# Patient Record
Sex: MALE | Race: WHITE | Employment: OTHER | URBAN - METROPOLITAN AREA
[De-identification: names, ages, dates, MRNs, and addresses within clinical notes are randomized per-mention and may not be internally consistent; named-entity substitution may affect disease eponyms.]

---

## 2017-11-18 ENCOUNTER — HOSPITAL ENCOUNTER (OUTPATIENT)
Dept: CT IMAGING | Age: 62
Discharge: HOME | End: 2017-11-18
Attending: ORTHOPAEDIC SURGERY
Payer: MEDICARE

## 2017-11-18 DIAGNOSIS — M19.072 ARTHRITIS OF LEFT ANKLE: ICD-10-CM

## 2017-11-18 PROCEDURE — 73700 CT LOWER EXTREMITY W/O DYE: CPT

## 2018-01-05 ENCOUNTER — HOSPITAL ENCOUNTER (OUTPATIENT)
Dept: PREADMISSION TESTING | Age: 63
Discharge: HOME | End: 2018-01-05

## 2018-01-05 VITALS
HEIGHT: 64 IN | DIASTOLIC BLOOD PRESSURE: 81 MMHG | WEIGHT: 169 LBS | OXYGEN SATURATION: 98 % | TEMPERATURE: 98.3 F | BODY MASS INDEX: 28.85 KG/M2 | SYSTOLIC BLOOD PRESSURE: 152 MMHG | HEART RATE: 62 BPM

## 2018-01-10 ENCOUNTER — APPOINTMENT (OUTPATIENT)
Dept: GENERAL RADIOLOGY | Age: 63
End: 2018-01-10
Attending: ORTHOPAEDIC SURGERY
Payer: MEDICARE

## 2018-01-10 ENCOUNTER — ANESTHESIA (OUTPATIENT)
Dept: SURGERY | Age: 63
End: 2018-01-10
Payer: MEDICARE

## 2018-01-10 ENCOUNTER — ANESTHESIA EVENT (OUTPATIENT)
Dept: SURGERY | Age: 63
End: 2018-01-10
Payer: MEDICARE

## 2018-01-10 ENCOUNTER — HOSPITAL ENCOUNTER (OUTPATIENT)
Age: 63
Setting detail: OBSERVATION
Discharge: HOME | End: 2018-01-11
Attending: ORTHOPAEDIC SURGERY | Admitting: ORTHOPAEDIC SURGERY
Payer: MEDICARE

## 2018-01-10 DIAGNOSIS — M79.672 FOOT PAIN, LEFT: ICD-10-CM

## 2018-01-10 DIAGNOSIS — M05.79 RHEUMATOID ARTHRITIS INVOLVING MULTIPLE SITES WITH POSITIVE RHEUMATOID FACTOR (HCC): Primary | ICD-10-CM

## 2018-01-10 PROCEDURE — 77030018836 HC SOL IRR NACL ICUM -A: Performed by: ORTHOPAEDIC SURGERY

## 2018-01-10 PROCEDURE — 77030003882 HC BIT DRL TWST BRSM -B: Performed by: ORTHOPAEDIC SURGERY

## 2018-01-10 PROCEDURE — 74011250636 HC RX REV CODE- 250/636: Performed by: ANESTHESIOLOGY

## 2018-01-10 PROCEDURE — 76010000132 HC OR TIME 2.5 TO 3 HR: Performed by: ORTHOPAEDIC SURGERY

## 2018-01-10 PROCEDURE — 74011250636 HC RX REV CODE- 250/636: Performed by: ORTHOPAEDIC SURGERY

## 2018-01-10 PROCEDURE — 77030003757 HC BIT DRL DISP ZIMM -C: Performed by: ORTHOPAEDIC SURGERY

## 2018-01-10 PROCEDURE — 76210000006 HC OR PH I REC 0.5 TO 1 HR: Performed by: ORTHOPAEDIC SURGERY

## 2018-01-10 PROCEDURE — 77030018673: Performed by: ORTHOPAEDIC SURGERY

## 2018-01-10 PROCEDURE — 77030028224 HC PDNG CST BSNM -A: Performed by: ORTHOPAEDIC SURGERY

## 2018-01-10 PROCEDURE — C1769 GUIDE WIRE: HCPCS | Performed by: ORTHOPAEDIC SURGERY

## 2018-01-10 PROCEDURE — 77030031139 HC SUT VCRL2 J&J -A: Performed by: ORTHOPAEDIC SURGERY

## 2018-01-10 PROCEDURE — 77030002916 HC SUT ETHLN J&J -A: Performed by: ORTHOPAEDIC SURGERY

## 2018-01-10 PROCEDURE — 77030003601 HC NDL NRV BLK BBMI -A

## 2018-01-10 PROCEDURE — 74011250636 HC RX REV CODE- 250/636

## 2018-01-10 PROCEDURE — 77030002933 HC SUT MCRYL J&J -A: Performed by: ORTHOPAEDIC SURGERY

## 2018-01-10 PROCEDURE — 77030011640 HC PAD GRND REM COVD -A: Performed by: ORTHOPAEDIC SURGERY

## 2018-01-10 PROCEDURE — C1713 ANCHOR/SCREW BN/BN,TIS/BN: HCPCS | Performed by: ORTHOPAEDIC SURGERY

## 2018-01-10 PROCEDURE — 99218 HC RM OBSERVATION: CPT

## 2018-01-10 PROCEDURE — 74011000250 HC RX REV CODE- 250

## 2018-01-10 PROCEDURE — 76001 XR FLUOROSCOPY OVER 60 MINUTES: CPT

## 2018-01-10 PROCEDURE — 73600 X-RAY EXAM OF ANKLE: CPT

## 2018-01-10 PROCEDURE — 77030020782 HC GWN BAIR PAWS FLX 3M -B

## 2018-01-10 PROCEDURE — 77030000032 HC CUF TRNQT ZIMM -B: Performed by: ORTHOPAEDIC SURGERY

## 2018-01-10 PROCEDURE — 76060000036 HC ANESTHESIA 2.5 TO 3 HR: Performed by: ORTHOPAEDIC SURGERY

## 2018-01-10 DEVICE — IMPLANTABLE DEVICE: Type: IMPLANTABLE DEVICE | Site: ANKLE | Status: FUNCTIONAL

## 2018-01-10 RX ORDER — MIDAZOLAM HYDROCHLORIDE 1 MG/ML
0.5 INJECTION, SOLUTION INTRAMUSCULAR; INTRAVENOUS
Status: DISCONTINUED | OUTPATIENT
Start: 2018-01-10 | End: 2018-01-10 | Stop reason: HOSPADM

## 2018-01-10 RX ORDER — DIPHENHYDRAMINE HYDROCHLORIDE 50 MG/ML
12.5 INJECTION, SOLUTION INTRAMUSCULAR; INTRAVENOUS AS NEEDED
Status: DISCONTINUED | OUTPATIENT
Start: 2018-01-10 | End: 2018-01-10 | Stop reason: HOSPADM

## 2018-01-10 RX ORDER — SODIUM CHLORIDE, SODIUM LACTATE, POTASSIUM CHLORIDE, CALCIUM CHLORIDE 600; 310; 30; 20 MG/100ML; MG/100ML; MG/100ML; MG/100ML
INJECTION, SOLUTION INTRAVENOUS
Status: DISCONTINUED | OUTPATIENT
Start: 2018-01-10 | End: 2018-01-10 | Stop reason: HOSPADM

## 2018-01-10 RX ORDER — PREDNISONE 5 MG/1
5 TABLET ORAL
Status: DISCONTINUED | OUTPATIENT
Start: 2018-01-11 | End: 2018-01-11 | Stop reason: HOSPADM

## 2018-01-10 RX ORDER — OXYCODONE HYDROCHLORIDE 5 MG/1
5 TABLET ORAL AS NEEDED
Status: DISCONTINUED | OUTPATIENT
Start: 2018-01-10 | End: 2018-01-10 | Stop reason: HOSPADM

## 2018-01-10 RX ORDER — ENOXAPARIN SODIUM 100 MG/ML
40 INJECTION SUBCUTANEOUS EVERY 24 HOURS
Status: DISCONTINUED | OUTPATIENT
Start: 2018-01-10 | End: 2018-01-11 | Stop reason: HOSPADM

## 2018-01-10 RX ORDER — GLYCOPYRROLATE 0.2 MG/ML
INJECTION INTRAMUSCULAR; INTRAVENOUS AS NEEDED
Status: DISCONTINUED | OUTPATIENT
Start: 2018-01-10 | End: 2018-01-10 | Stop reason: HOSPADM

## 2018-01-10 RX ORDER — ONDANSETRON 2 MG/ML
4 INJECTION INTRAMUSCULAR; INTRAVENOUS AS NEEDED
Status: DISCONTINUED | OUTPATIENT
Start: 2018-01-10 | End: 2018-01-10 | Stop reason: HOSPADM

## 2018-01-10 RX ORDER — ROPIVACAINE HYDROCHLORIDE 5 MG/ML
30 INJECTION, SOLUTION EPIDURAL; INFILTRATION; PERINEURAL AS NEEDED
Status: DISCONTINUED | OUTPATIENT
Start: 2018-01-10 | End: 2018-01-10 | Stop reason: HOSPADM

## 2018-01-10 RX ORDER — NALOXONE HYDROCHLORIDE 0.4 MG/ML
0.4 INJECTION, SOLUTION INTRAMUSCULAR; INTRAVENOUS; SUBCUTANEOUS AS NEEDED
Status: DISCONTINUED | OUTPATIENT
Start: 2018-01-10 | End: 2018-01-11 | Stop reason: HOSPADM

## 2018-01-10 RX ORDER — MORPHINE SULFATE 4 MG/ML
INJECTION, SOLUTION INTRAMUSCULAR; INTRAVENOUS AS NEEDED
Status: DISCONTINUED | OUTPATIENT
Start: 2018-01-10 | End: 2018-01-10 | Stop reason: HOSPADM

## 2018-01-10 RX ORDER — ONDANSETRON 2 MG/ML
INJECTION INTRAMUSCULAR; INTRAVENOUS AS NEEDED
Status: DISCONTINUED | OUTPATIENT
Start: 2018-01-10 | End: 2018-01-10 | Stop reason: HOSPADM

## 2018-01-10 RX ORDER — PROPOFOL 10 MG/ML
INJECTION, EMULSION INTRAVENOUS AS NEEDED
Status: DISCONTINUED | OUTPATIENT
Start: 2018-01-10 | End: 2018-01-10 | Stop reason: HOSPADM

## 2018-01-10 RX ORDER — SODIUM CHLORIDE 0.9 % (FLUSH) 0.9 %
5-10 SYRINGE (ML) INJECTION AS NEEDED
Status: DISCONTINUED | OUTPATIENT
Start: 2018-01-10 | End: 2018-01-10 | Stop reason: HOSPADM

## 2018-01-10 RX ORDER — SODIUM CHLORIDE 9 MG/ML
25 INJECTION, SOLUTION INTRAVENOUS CONTINUOUS
Status: DISCONTINUED | OUTPATIENT
Start: 2018-01-10 | End: 2018-01-10 | Stop reason: HOSPADM

## 2018-01-10 RX ORDER — ACETAMINOPHEN 325 MG/1
650 TABLET ORAL
Status: DISCONTINUED | OUTPATIENT
Start: 2018-01-10 | End: 2018-01-11 | Stop reason: HOSPADM

## 2018-01-10 RX ORDER — SODIUM CHLORIDE 0.9 % (FLUSH) 0.9 %
5-10 SYRINGE (ML) INJECTION AS NEEDED
Status: DISCONTINUED | OUTPATIENT
Start: 2018-01-10 | End: 2018-01-11 | Stop reason: HOSPADM

## 2018-01-10 RX ORDER — FENTANYL CITRATE 50 UG/ML
25 INJECTION, SOLUTION INTRAMUSCULAR; INTRAVENOUS
Status: DISCONTINUED | OUTPATIENT
Start: 2018-01-10 | End: 2018-01-10 | Stop reason: HOSPADM

## 2018-01-10 RX ORDER — OXYCODONE AND ACETAMINOPHEN 10; 325 MG/1; MG/1
1 TABLET ORAL
Status: DISCONTINUED | OUTPATIENT
Start: 2018-01-10 | End: 2018-01-11 | Stop reason: HOSPADM

## 2018-01-10 RX ORDER — LIDOCAINE HYDROCHLORIDE 10 MG/ML
0.1 INJECTION, SOLUTION EPIDURAL; INFILTRATION; INTRACAUDAL; PERINEURAL AS NEEDED
Status: DISCONTINUED | OUTPATIENT
Start: 2018-01-10 | End: 2018-01-10 | Stop reason: HOSPADM

## 2018-01-10 RX ORDER — DEXAMETHASONE SODIUM PHOSPHATE 4 MG/ML
INJECTION, SOLUTION INTRA-ARTICULAR; INTRALESIONAL; INTRAMUSCULAR; INTRAVENOUS; SOFT TISSUE AS NEEDED
Status: DISCONTINUED | OUTPATIENT
Start: 2018-01-10 | End: 2018-01-10 | Stop reason: HOSPADM

## 2018-01-10 RX ORDER — SODIUM CHLORIDE 0.9 % (FLUSH) 0.9 %
5-10 SYRINGE (ML) INJECTION EVERY 8 HOURS
Status: DISCONTINUED | OUTPATIENT
Start: 2018-01-10 | End: 2018-01-11 | Stop reason: HOSPADM

## 2018-01-10 RX ORDER — MIDAZOLAM HYDROCHLORIDE 1 MG/ML
1 INJECTION, SOLUTION INTRAMUSCULAR; INTRAVENOUS AS NEEDED
Status: DISCONTINUED | OUTPATIENT
Start: 2018-01-10 | End: 2018-01-10 | Stop reason: HOSPADM

## 2018-01-10 RX ORDER — ROCURONIUM BROMIDE 10 MG/ML
INJECTION, SOLUTION INTRAVENOUS AS NEEDED
Status: DISCONTINUED | OUTPATIENT
Start: 2018-01-10 | End: 2018-01-10 | Stop reason: HOSPADM

## 2018-01-10 RX ORDER — SODIUM CHLORIDE, SODIUM LACTATE, POTASSIUM CHLORIDE, CALCIUM CHLORIDE 600; 310; 30; 20 MG/100ML; MG/100ML; MG/100ML; MG/100ML
100 INJECTION, SOLUTION INTRAVENOUS CONTINUOUS
Status: DISCONTINUED | OUTPATIENT
Start: 2018-01-10 | End: 2018-01-10 | Stop reason: HOSPADM

## 2018-01-10 RX ORDER — LEFLUNOMIDE 10 MG/1
20 TABLET ORAL DAILY
Status: DISCONTINUED | OUTPATIENT
Start: 2018-01-11 | End: 2018-01-11 | Stop reason: HOSPADM

## 2018-01-10 RX ORDER — LIDOCAINE HYDROCHLORIDE 20 MG/ML
INJECTION, SOLUTION EPIDURAL; INFILTRATION; INTRACAUDAL; PERINEURAL AS NEEDED
Status: DISCONTINUED | OUTPATIENT
Start: 2018-01-10 | End: 2018-01-10 | Stop reason: HOSPADM

## 2018-01-10 RX ORDER — HYDROMORPHONE HYDROCHLORIDE 2 MG/ML
1 INJECTION, SOLUTION INTRAMUSCULAR; INTRAVENOUS; SUBCUTANEOUS
Status: DISCONTINUED | OUTPATIENT
Start: 2018-01-10 | End: 2018-01-11 | Stop reason: HOSPADM

## 2018-01-10 RX ORDER — SUCCINYLCHOLINE CHLORIDE 20 MG/ML
INJECTION INTRAMUSCULAR; INTRAVENOUS AS NEEDED
Status: DISCONTINUED | OUTPATIENT
Start: 2018-01-10 | End: 2018-01-10 | Stop reason: HOSPADM

## 2018-01-10 RX ORDER — BUMETANIDE 1 MG/1
1 TABLET ORAL DAILY
Status: DISCONTINUED | OUTPATIENT
Start: 2018-01-11 | End: 2018-01-11 | Stop reason: HOSPADM

## 2018-01-10 RX ORDER — SODIUM CHLORIDE 0.9 % (FLUSH) 0.9 %
5-10 SYRINGE (ML) INJECTION EVERY 8 HOURS
Status: DISCONTINUED | OUTPATIENT
Start: 2018-01-10 | End: 2018-01-10 | Stop reason: HOSPADM

## 2018-01-10 RX ORDER — FENTANYL CITRATE 50 UG/ML
INJECTION, SOLUTION INTRAMUSCULAR; INTRAVENOUS AS NEEDED
Status: DISCONTINUED | OUTPATIENT
Start: 2018-01-10 | End: 2018-01-10 | Stop reason: HOSPADM

## 2018-01-10 RX ORDER — SODIUM CHLORIDE, SODIUM LACTATE, POTASSIUM CHLORIDE, CALCIUM CHLORIDE 600; 310; 30; 20 MG/100ML; MG/100ML; MG/100ML; MG/100ML
25 INJECTION, SOLUTION INTRAVENOUS CONTINUOUS
Status: DISCONTINUED | OUTPATIENT
Start: 2018-01-10 | End: 2018-01-10 | Stop reason: HOSPADM

## 2018-01-10 RX ORDER — SODIUM CHLORIDE 9 MG/ML
50 INJECTION, SOLUTION INTRAVENOUS CONTINUOUS
Status: DISCONTINUED | OUTPATIENT
Start: 2018-01-10 | End: 2018-01-11 | Stop reason: HOSPADM

## 2018-01-10 RX ORDER — EPHEDRINE SULFATE 50 MG/ML
INJECTION, SOLUTION INTRAVENOUS AS NEEDED
Status: DISCONTINUED | OUTPATIENT
Start: 2018-01-10 | End: 2018-01-10 | Stop reason: HOSPADM

## 2018-01-10 RX ORDER — FENTANYL CITRATE 50 UG/ML
50 INJECTION, SOLUTION INTRAMUSCULAR; INTRAVENOUS AS NEEDED
Status: DISCONTINUED | OUTPATIENT
Start: 2018-01-10 | End: 2018-01-10 | Stop reason: HOSPADM

## 2018-01-10 RX ORDER — MORPHINE SULFATE 10 MG/ML
2 INJECTION, SOLUTION INTRAMUSCULAR; INTRAVENOUS
Status: DISCONTINUED | OUTPATIENT
Start: 2018-01-10 | End: 2018-01-10 | Stop reason: HOSPADM

## 2018-01-10 RX ORDER — CEFAZOLIN SODIUM/WATER 2 G/20 ML
2 SYRINGE (ML) INTRAVENOUS ONCE
Status: COMPLETED | OUTPATIENT
Start: 2018-01-10 | End: 2018-01-10

## 2018-01-10 RX ORDER — ONDANSETRON 2 MG/ML
4 INJECTION INTRAMUSCULAR; INTRAVENOUS
Status: DISCONTINUED | OUTPATIENT
Start: 2018-01-10 | End: 2018-01-11 | Stop reason: HOSPADM

## 2018-01-10 RX ORDER — CEFAZOLIN SODIUM/WATER 2 G/20 ML
2 SYRINGE (ML) INTRAVENOUS EVERY 8 HOURS
Status: COMPLETED | OUTPATIENT
Start: 2018-01-10 | End: 2018-01-11

## 2018-01-10 RX ADMIN — SODIUM CHLORIDE, SODIUM LACTATE, POTASSIUM CHLORIDE, AND CALCIUM CHLORIDE 25 ML/HR: 600; 310; 30; 20 INJECTION, SOLUTION INTRAVENOUS at 13:59

## 2018-01-10 RX ADMIN — SODIUM CHLORIDE 50 ML/HR: 900 INJECTION, SOLUTION INTRAVENOUS at 18:00

## 2018-01-10 RX ADMIN — FENTANYL CITRATE 50 MCG: 50 INJECTION, SOLUTION INTRAMUSCULAR; INTRAVENOUS at 14:05

## 2018-01-10 RX ADMIN — SUCCINYLCHOLINE CHLORIDE 120 MG: 20 INJECTION INTRAMUSCULAR; INTRAVENOUS at 15:08

## 2018-01-10 RX ADMIN — FENTANYL CITRATE 25 MCG: 50 INJECTION, SOLUTION INTRAMUSCULAR; INTRAVENOUS at 16:49

## 2018-01-10 RX ADMIN — GLYCOPYRROLATE 0.1 MG: 0.2 INJECTION INTRAMUSCULAR; INTRAVENOUS at 15:50

## 2018-01-10 RX ADMIN — Medication 2 G: at 22:56

## 2018-01-10 RX ADMIN — FENTANYL CITRATE 25 MCG: 50 INJECTION, SOLUTION INTRAMUSCULAR; INTRAVENOUS at 17:14

## 2018-01-10 RX ADMIN — FENTANYL CITRATE 50 MCG: 50 INJECTION, SOLUTION INTRAMUSCULAR; INTRAVENOUS at 15:46

## 2018-01-10 RX ADMIN — ROCURONIUM BROMIDE 10 MG: 10 INJECTION, SOLUTION INTRAVENOUS at 15:08

## 2018-01-10 RX ADMIN — EPHEDRINE SULFATE 5 MG: 50 INJECTION, SOLUTION INTRAVENOUS at 15:10

## 2018-01-10 RX ADMIN — LIDOCAINE HYDROCHLORIDE 100 MG: 20 INJECTION, SOLUTION EPIDURAL; INFILTRATION; INTRACAUDAL; PERINEURAL at 15:08

## 2018-01-10 RX ADMIN — MIDAZOLAM HYDROCHLORIDE 2 MG: 1 INJECTION, SOLUTION INTRAMUSCULAR; INTRAVENOUS at 14:05

## 2018-01-10 RX ADMIN — FENTANYL CITRATE 100 MCG: 50 INJECTION, SOLUTION INTRAMUSCULAR; INTRAVENOUS at 15:08

## 2018-01-10 RX ADMIN — ENOXAPARIN SODIUM 40 MG: 40 INJECTION SUBCUTANEOUS at 20:16

## 2018-01-10 RX ADMIN — PROPOFOL 150 MG: 10 INJECTION, EMULSION INTRAVENOUS at 15:08

## 2018-01-10 RX ADMIN — EPHEDRINE SULFATE 2.5 MG: 50 INJECTION, SOLUTION INTRAVENOUS at 16:09

## 2018-01-10 RX ADMIN — SODIUM CHLORIDE, SODIUM LACTATE, POTASSIUM CHLORIDE, CALCIUM CHLORIDE: 600; 310; 30; 20 INJECTION, SOLUTION INTRAVENOUS at 14:46

## 2018-01-10 RX ADMIN — MORPHINE SULFATE 4 MG: 4 INJECTION, SOLUTION INTRAMUSCULAR; INTRAVENOUS at 15:49

## 2018-01-10 RX ADMIN — GLYCOPYRROLATE 0.1 MG: 0.2 INJECTION INTRAMUSCULAR; INTRAVENOUS at 17:43

## 2018-01-10 RX ADMIN — DEXAMETHASONE SODIUM PHOSPHATE 10 MG: 4 INJECTION, SOLUTION INTRA-ARTICULAR; INTRALESIONAL; INTRAMUSCULAR; INTRAVENOUS; SOFT TISSUE at 15:18

## 2018-01-10 RX ADMIN — EPHEDRINE SULFATE 2.5 MG: 50 INJECTION, SOLUTION INTRAVENOUS at 16:27

## 2018-01-10 RX ADMIN — PROPOFOL 50 MG: 10 INJECTION, EMULSION INTRAVENOUS at 15:48

## 2018-01-10 RX ADMIN — Medication 2 G: at 15:15

## 2018-01-10 RX ADMIN — ROPIVACAINE HYDROCHLORIDE 150 MG: 5 INJECTION, SOLUTION EPIDURAL; INFILTRATION; PERINEURAL at 14:12

## 2018-01-10 RX ADMIN — ONDANSETRON 4 MG: 2 INJECTION INTRAMUSCULAR; INTRAVENOUS at 17:43

## 2018-01-10 NOTE — IP AVS SNAPSHOT
[address] 
[phone] Patient: Jarett Oliva MRN: [id number] :1955 A check eleazar indicates which time of day the medication should be taken. My Medications START taking these medications Instructions Each Dose to Equal  
 Morning Noon Evening Bedtime  
 aspirin 325 mg tablet Commonly known as:  ASPIRIN Your last dose was: Your next dose is: Take 1 Tab by mouth daily. 325 mg  
    
   
   
   
  
 oxyCODONE-acetaminophen 5-325 mg per tablet Commonly known as:  PERCOCET Your last dose was: Your next dose is: Take 2 Tabs by mouth every four (4) hours as needed for Pain. Max Daily Amount: 12 Tabs. 2 Tab CONTINUE taking these medications Instructions Each Dose to Equal  
 Morning Noon Evening Bedtime  
 alendronate 70 mg tablet Commonly known as:  FOSAMAX Your last dose was: Your next dose is: TAKE 1 TABLET BY MOUTH EVERY   
     
   
   
   
  
 bumetanide 1 mg tablet Commonly known as:  Quita Slight Your last dose was: Your next dose is: Take 1 Tab by mouth daily as needed. 1 mg HYDROcodone-acetaminophen 5-325 mg per tablet Commonly known as:  Linda Fling Your last dose was: Your next dose is: Take 1 Tab by mouth every six (6) hours as needed for Pain. 1 Tab  
    
   
   
   
  
 leflunomide 20 mg tablet Commonly known as:  Rebbecca Lien Your last dose was: Your next dose is: TAKE 1 TABLET BY MOUTH DAILY. predniSONE 5 mg tablet Commonly known as:  Nirav Galarzad Your last dose was: Your next dose is: TAKE 1 TABLET BY MOUTH EVERY DAY OR AS DIRECTED  
     
   
   
   
  
 triamcinolone acetonide 0.1 % ointment Commonly known as:  KENALOG Your last dose was: Your next dose is:    
   
   
 Apply  to affected area two (2) times a day. use thin layer Where to Get Your Medications These medications were sent to [address], [address] [place] [address], [place] Hours:  24-hours Phone:  [phone]  
  predniSONE 5 mg tablet Information on where to get these meds will be given to you by the nurse or doctor. ! Ask your nurse or doctor about these medications  
  aspirin 325 mg tablet  
 oxyCODONE-acetaminophen 5-325 mg per tablet

## 2018-01-10 NOTE — PERIOP NOTES
Left popliteal nerve block performed by Dr. Zia Mulligan. Oxygen and monitors in place per protocol. Patient tolerated procedure well.

## 2018-01-10 NOTE — ANESTHESIA PROCEDURE NOTES
Peripheral Block    Start time: 1/10/2018 2:03 PM  End time: 1/10/2018 2:09 PM  Performed by: Loc Guajardo  Authorized by: Loc Guajardo       Pre-procedure:    Indications: at surgeon's request and post-op pain management    Preanesthetic Checklist: patient identified, risks and benefits discussed, site marked, timeout performed, anesthesia consent given and patient being monitored    Timeout Time: 14:03          Block Type:   Block Type:  Popliteal  Laterality:  Left  Monitoring:  Standard ASA monitoring, continuous pulse ox, frequent vital sign checks, heart rate, responsive to questions and oxygen  Injection Technique:  Single shot  Procedures: ultrasound guided and nerve stimulator    Patient Position: supine  Prep: betadine and povidone-iodine 7.5% surgical scrub    Location:  Lower thigh  Needle Type:  Stimuplex  Needle Gauge:  22 G  Needle Localization:  Nerve stimulator and ultrasound guidance  Motor Response: minimal motor response >0.4 mA    Medication Injected:  0.5%  ropivacaine  Volume (mL):  30    Assessment:  Number of attempts:  1  Injection Assessment:  Incremental injection every 5 mL, local visualized surrounding nerve on ultrasound, negative aspiration for CSF, negative aspiration for blood, no paresthesia, no intravascular symptoms and ultrasound image on chart  Patient tolerance:  Patient tolerated the procedure well with no immediate complications

## 2018-01-10 NOTE — ANESTHESIA PREPROCEDURE EVALUATION
Anesthetic History   No history of anesthetic complications            Review of Systems / Medical History  Patient summary reviewed, nursing notes reviewed and pertinent labs reviewed    Pulmonary  Within defined limits                 Neuro/Psych   Within defined limits           Cardiovascular  Within defined limits                     GI/Hepatic/Renal  Within defined limits              Endo/Other  Within defined limits           Other Findings   Comments: Rheumatoid arthritis         Physical Exam    Airway  Mallampati: II  TM Distance: > 6 cm  Neck ROM: normal range of motion   Mouth opening: Normal     Cardiovascular  Regular rate and rhythm,  S1 and S2 normal,  no murmur, click, rub, or gallop             Dental  No notable dental hx       Pulmonary  Breath sounds clear to auscultation               Abdominal  GI exam deferred       Other Findings            Anesthetic Plan    ASA: 3  Anesthesia type: general      Post-op pain plan if not by surgeon: peripheral nerve block single    Induction: Intravenous  Anesthetic plan and risks discussed with: Patient

## 2018-01-10 NOTE — PERIOP NOTES
1449 - Patient interviewed in holding area, patient identity, allergies, and procedure verified. LEFT side is the CORRECT side. 65 - Patient's family member called and updated on patient progression and start of procedure. 1,000 mL of 0.9% Normal Saline added to sterile field for PRN irrigation throughout procedure.    No specimen collected for this procedure, verified with MD.

## 2018-01-10 NOTE — IP AVS SNAPSHOT
2700 [facility] [address] [place] [address] 
[phone] Patient: Martín Sanford MRN: [id number] :1955 About your hospitalization You were admitted on:  January 10, 2018 You last received care in the:  [facility] You were discharged on:  2018 Why you were hospitalized Your primary diagnosis was:  Not on File Your diagnoses also included: Foot Pain, Left Follow-up Information Follow up With Details Comments Contact Info [address] [facility]   [address] 
1st Floor [facility] [id number] [phone] Akshat Tania, [facility] 
[phone] Discharge Orders None A check eleazar indicates which time of day the medication should be taken. My Medications START taking these medications Instructions Each Dose to Equal  
 Morning Noon Evening Bedtime  
 aspirin 325 mg tablet Commonly known as:  ASPIRIN Your last dose was: Your next dose is: Take 1 Tab by mouth daily. 325 mg  
    
   
   
   
  
 oxyCODONE-acetaminophen 5-325 mg per tablet Commonly known as:  PERCOCET Your last dose was: Your next dose is: Take 2 Tabs by mouth every four (4) hours as needed for Pain. Max Daily Amount: 12 Tabs. 2 Tab CONTINUE taking these medications Instructions Each Dose to Equal  
 Morning Noon Evening Bedtime  
 alendronate 70 mg tablet Commonly known as:  FOSAMAX Your last dose was: Your next dose is: TAKE 1 TABLET BY MOUTH EVERY   
     
   
   
   
  
 bumetanide 1 mg tablet Commonly known as:  Trevor Tysonw Your last dose was: Your next dose is: Take 1 Tab by mouth daily as needed. 1 mg HYDROcodone-acetaminophen 5-325 mg per tablet Commonly known as:  Deng Mosquera  
   
 Your last dose was: Your next dose is: Take 1 Tab by mouth every six (6) hours as needed for Pain. 1 Tab  
    
   
   
   
  
 leflunomide 20 mg tablet Commonly known as:  Ethel Calvo Your last dose was: Your next dose is: TAKE 1 TABLET BY MOUTH DAILY. predniSONE 5 mg tablet Commonly known as:  Brii Almaguera Your last dose was: Your next dose is: TAKE 1 TABLET BY MOUTH EVERY DAY OR AS DIRECTED  
     
   
   
   
  
 triamcinolone acetonide 0.1 % ointment Commonly known as:  KENALOG Your last dose was: Your next dose is:    
   
   
 Apply  to affected area two (2) times a day. use thin layer Where to Get Your Medications These medications were sent to [address], [address], [address] Hours:  24-hours Phone:  [phone]  
  predniSONE 5 mg tablet Information on where to get these meds will be given to you by the nurse or doctor. ! Ask your nurse or doctor about these medications  
  aspirin 325 mg tablet  
 oxyCODONE-acetaminophen 5-325 mg per tablet Discharge Instructions No weight left leg Dr. Lisa Billingsley Postoperative Patient Instructions 1. Please maintain the dressing and/or splint placed at surgery until your follow up appointment. We will remove it at your appointment. Please keep the dressing clean and dry. If you have any questions or problems with your dressing, please call our office at [phone]. 
2. Please elevate the operative extremity to the level of the heart to keep swelling at a minimum. You may get up to move around, but when seated please keep the extremity elevated as much as possible. This will decrease swelling and postoperative pain. 3. If you were told to be non-weight bearing following surgery, please do not place the foot on the ground. You may use crutches or we can help arrange for a scooter for you to stay off of your operative leg. 4. If you are in a special shoe or boot and told that you may bear weight as tolerated, then you may do so, but please keep the extremity elevated when not moving around. 5. If you had a block from the Anesthesia doctor before your surgery, expect this to wear off around 12-24 hours after you received it and you should start taking your pain medication when the feeling begins to come back into your leg. 6. A prescription for pain medicine is provided. The key to pain control is staying ahead. For the first 48-72 hours after your surgery, you may want to take your pain medication of a regular schedule. After that, you may only need it on an as-needed basis. 7. Please begin taking one Enteric Coated Aspirin 325 mg daily on the morning after your surgery until you are told you do not need to do this anymore. This can lower the risk of developing a blood clot after surgery. If you are not sure if you can take an Aspirin daily, please check with your primary care doctor to verify. 8. Signs and symptoms of infection include: redness, increased pain, increased swelling not relieved by elevation, drainage, fever, or chills. If you develop any of these symptoms, call the office at [phone]. 
9. Please follow-up at my office at 12-15 days after surgery or when specified at your pre-operative appointment. Please follow the [address] [place], [place] [address]way 10 Discharge Instructions provided to you by Dr. Jose Manuel for your medications. DATE OF FOLLOW-UP APPOINTMENT: _____________________________________ 
             [address] Landen Shaffer MD 
1/11/2018 Introducing [address]!    
 [facility] [employer] introduces [employer] patient portal. Now you can access parts of your medical record, email your doctor's office, and request medication refills online. 1. In your internet browser, go to [email or web address] 2. Click on the First Time User? Click Here link in the Sign In box. You will see the New Member Sign Up page. 3. Enter your [employer] Access Code exactly as it appears below. You will not need to use this code after youve completed the sign-up process. If you do not sign up before the expiration date, you must request a new code. · [employer] Access Code: [email or web address] Expires: 3/3/2018  8:40 AM 
 
4. Enter the last four digits of your Social Security Number (xxxx) and Date of Birth (mm/dd/yyyy) as indicated and click Submit. You will be taken to the next sign-up page. 5. Create a Cast[employer] ID. This will be your Cast[employer] login ID and cannot be changed, so think of one that is secure and easy to remember. 6. Create a [employer] password. You can change your password at any time. 7. Enter your Password Reset Question and Answer. This can be used at a later time if you forget your password. 8. Enter your e-mail address. You will receive e-mail notification when new information is available in [address] 9. Click Sign Up. You can now view and download portions of your medical record. 10. Click the Download Summary menu link to download a portable copy of your medical information. If you have questions, please visit the Frequently Asked Questions section of the [employer] website. Remember, [employer] is NOT to be used for urgent needs. For medical emergencies, dial 911. Now available from your iPhone and Android! Providers Seen During Your Hospitalization Provider Specialty Primary office phone Tiff Pretty, [address] [facility] [phone] Your Primary Care Physician (PCP) Primary Care Physician Office Phone Office Fax Spike Diaz [phone] You are allergic to the following No active allergies Recent Documentation Height Weight BMI Smoking Status 1.632 m 76.7 kg 28.78 kg/m2 Former Smoker Emergency Contacts Name Discharge Info Relation Home Work Mobile Bri Martin DISCHARGE CAREGIVER [3] Friend [5] [phone] Patient Belongings The following personal items are in your possession at time of discharge: 
  Dental Appliances: None  Visual Aid: None      Home Medications: None   Jewelry: None  Clothing:  (bag of clothes returned to patient)    Other Valuables: None Please provide this summary of care documentation to your next provider. Signatures-by signing, you are acknowledging that this After Visit Summary has been reviewed with you and you have received a copy. Patient Signature:  ____________________________________________________________ Date:  ____________________________________________________________  
  
[employer] Provider Signature:  ____________________________________________________________ Date:  ____________________________________________________________

## 2018-01-10 NOTE — BRIEF OP NOTE
BRIEF OPERATIVE NOTE    Date of Procedure: 1/10/2018   Preoperative Diagnosis: ARTHRITIS LEFT ANKLE, LEFT ANKLE PAIN  Postoperative Diagnosis: ARTHRITIS LEFT ANKLE, LEFT ANKLE PAIN    Procedure(s):  LEFT TIBIO TALOR CALCANEAL FUSION WITH INTRAMEDULLARY LOS  Surgeon(s) and Role:     * Landen Shaffer MD - Primary         Assistant Staff:       Surgical Staff:  Circ-1: Ana Snow RN  Radiology Technician: Brittaney Barrera, RT  Scrub Tech-1: Shanna Bolton  Scrub Tech-Relief: Dick Benitez  Surg Asst-1: Rodrigo Dean RN  Float Staff: Yvette Rivas; Sumaya Nguyne RN  Event Time In   Incision Start 1526   Incision Close 1745     Anesthesia: Other   Estimated Blood Loss: min  Specimens: * No specimens in log *   Findings: as above   Complications: none  Implants:   Implant Name Type Inv.  Item Serial No.  Lot No. LRB No. Used Action   NAIL ANKL LCK [id number] TI --  - SN/A  NAIL ANKL LCK [id number] TI --  N/A BIOMET TRAUMA [id number] Left 1 Implanted   SCR BNE SUSAN DBL-LD 5X26MM TI -- [place] - SN/A  SCR BNE SUSAN DBL-LD 5X26MM TI -- [place] N/A BIOMET TRAUMA [id number] Left 1 Implanted   SCR BNE SUSAN DBL-LD 5X26MM TI -- [place] - SN/A  SCR BNE SUSAN DBL-LD 5X26MM TI -- [place] N/A BIOMET TRAUMA [id number] Left 1 Implanted   SCR BNE SUSAN DBL-LD 5X44MM TI -- [place] - SN/A  SCR BNE SUSAN DBL-LD 5X44MM TI -- [place] N/A BIOMET TRAUMA [id number] Left 1 Implanted   SCR BNE SUSAN DBL-LD 5X70MM TI -- [place] - SN/A   SCR BNE SUSAN DBL-LD 5X70MM TI -- [place] N/A BIOMET TRAUMA [id number] Left 1 Implanted

## 2018-01-10 NOTE — PERIOP NOTES
TRANSFER - OUT REPORT:    Verbal report given to denton (name) on Shantelle Whittington  being transferred to room 575 (unit) for routine post - op       Report consisted of patients Situation, Background, Assessment and   Recommendations(SBAR). Time Pre op antibiotic given:1515  Anesthesia Stop time: 3442  Heaton Present on Transfer to floor:no      Information from the following report(s) SBAR, Kardex, OR Summary, Intake/Output, MAR, Med Rec Status and Cardiac Rhythm SR was reviewed with the receiving nurse. Opportunity for questions and clarification was provided. Is the patient on 02? YES       L/Min 2L      Is the patient on a monitor? NO    Is the nurse transporting with the patient? NO    Surgical Waiting Area notified of patient's transfer from PACU? YES      The following personal items collected during your admission accompanied patient upon transfer:   Dental Appliance: Dental Appliances: None  Vision: Visual Aid: None  Hearing Aid:    Jewelry: Jewelry: None  Clothing: Clothing:  (bag of clothes returned to patient)  Other Valuables:  Other Valuables: None  Valuables sent to safe:

## 2018-01-10 NOTE — PERIOP NOTES
Patient: Veronica Arthur MRN: [id number]  SSN: [id number]   YOB: 1955  Age: 60 y.o. Sex: male     Patient is status post Procedure(s):  LEFT TIBIO TALOR CALCANEAL FUSION WITH INTRAMEDULLARY LOS. Surgeon(s) and Role:     * Radha Leonardo MD - Primary    Local/Dose/Irrigation:  0.9% NaCl for irrigation                  Peripheral IV 01/10/18 Left Antecubital (Active)   Site Assessment Clean, dry, & intact 1/10/2018  1:58 PM   Phlebitis Assessment 0 1/10/2018  1:58 PM   Infiltration Assessment 0 1/10/2018  1:58 PM   Dressing Status Clean, dry, & intact; New 1/10/2018  1:58 PM   Dressing Type Tape;Transparent 1/10/2018  1:58 PM   Hub Color/Line Status Green 1/10/2018  1:58 PM            Airway - Endotracheal Tube 01/10/18 Oral (Active)                   Dressing/Packing:  Wound Ankle Left-DRESSING TYPE: 4 x 4;Xeroform; Cast padding;Elastic bandage (01/10/18 8811)  Splint/Cast: Wound Ankle Left-SPLINT TYPE/MATERIAL: Cast, plaster]

## 2018-01-11 ENCOUNTER — HOME HEALTH ADMISSION (OUTPATIENT)
Dept: HOME HEALTH SERVICES | Facility: HOME HEALTH | Age: 63
End: 2018-01-11
Payer: MEDICARE

## 2018-01-11 VITALS
SYSTOLIC BLOOD PRESSURE: 119 MMHG | BODY MASS INDEX: 28.85 KG/M2 | HEIGHT: 64 IN | HEART RATE: 60 BPM | TEMPERATURE: 98 F | DIASTOLIC BLOOD PRESSURE: 72 MMHG | RESPIRATION RATE: 16 BRPM | OXYGEN SATURATION: 100 % | WEIGHT: 169 LBS

## 2018-01-11 PROCEDURE — G8978 MOBILITY CURRENT STATUS: HCPCS

## 2018-01-11 PROCEDURE — 97116 GAIT TRAINING THERAPY: CPT

## 2018-01-11 PROCEDURE — 99218 HC RM OBSERVATION: CPT

## 2018-01-11 PROCEDURE — 74011250636 HC RX REV CODE- 250/636: Performed by: ORTHOPAEDIC SURGERY

## 2018-01-11 PROCEDURE — 74011250637 HC RX REV CODE- 250/637: Performed by: ORTHOPAEDIC SURGERY

## 2018-01-11 PROCEDURE — G8979 MOBILITY GOAL STATUS: HCPCS

## 2018-01-11 PROCEDURE — 97161 PT EVAL LOW COMPLEX 20 MIN: CPT

## 2018-01-11 RX ORDER — ASPIRIN 325 MG
325 TABLET ORAL DAILY
Qty: 90 TAB | Refills: 0 | Status: SHIPPED | OUTPATIENT
Start: 2018-01-11

## 2018-01-11 RX ORDER — OXYCODONE AND ACETAMINOPHEN 5; 325 MG/1; MG/1
2 TABLET ORAL
Qty: 40 TAB | Refills: 0 | Status: SHIPPED | OUTPATIENT
Start: 2018-01-11 | End: 2018-03-19

## 2018-01-11 RX ADMIN — LEFLUNOMIDE 20 MG: 10 TABLET, FILM COATED ORAL at 09:04

## 2018-01-11 RX ADMIN — Medication 10 ML: at 13:45

## 2018-01-11 RX ADMIN — Medication 2 G: at 07:23

## 2018-01-11 RX ADMIN — OXYCODONE HYDROCHLORIDE AND ACETAMINOPHEN 1 TABLET: 10; 325 TABLET ORAL at 14:05

## 2018-01-11 RX ADMIN — BUMETANIDE 1 MG: 1 TABLET ORAL at 09:04

## 2018-01-11 NOTE — OP NOTES
[address]  ACUTE CARE OP NOTE    Name:PILI JARRELL MR#: [id number]  : 1955  ACCOUNT #: [de-identified]   DATE OF SERVICE: 01/10/2018    SURGEON:  Lucy Moran MD    PREOPERATIVE DIAGNOSES:  1. Left ankle severe arthritis. 2.  Rheumatoid arthritis. 3.  Left foot and ankle pain. POSTOPERATIVE DIAGNOSES:  1. Left ankle severe arthritis. 2.  Rheumatoid arthritis. 3.  Left foot and ankle pain. PROCEDURE PERFORMED:  Left ankle arthrodesis with hindfoot fusion riya. ANESTHESIA:  General.    ESTIMATED BLOOD LOSS:  Minimal.    COMPLICATIONS:  None. TOURNIQUET TIME:  130 minutes. IMPLANTS:  Biomet hindfoot fusion riya 11 x 180 mm. DISPOSITION:  The patient was taken to recovery in stable condition. SPECIMENS REMOVED:     OPERATIVE INDICATIONS:  The patient is a 68-year-old male with longstanding rheumatoid arthritis and longstanding problems with his left foot and ankle. I just met him more recently. He has severe hindfoot arthritis with a significant valgus alignment to his leg. We discussed options. He wished to go forward with surgical correction of the above. Informed consent was obtained including all risks and benefits and he wished to proceed. OPERATIVE PROCEDURE:  The patient was identified in the preop holding area. Left lower extremity was marked with the patient. All preoperative questions were answered. He was seen by the anesthesia department. A lower extremity block was performed. He was taken to the operating room and transferred to the operating table in supine position. Once appropriate anesthesia was obtained, tourniquet was placed around the left thigh. Left leg was prepped and draped in usual sterile fashion. Timeout was conducted indicating correct operative site and preoperative antibiotics given prior to incision being made. Next, the leg was elevated, tourniquet was inflated.   Fluoroscopy was brought in and used throughout the case.  An extensile lateral incision was made first along the fibula curving along the subtalar joint to the calcaneus. This was dissected down to the fibula. He had extremely osteoporotic type bone. An acetabular reamer was used to remove the distal fibula for bone graft, but then it also came out quite easily with a rongeur. Once this was done, the tibiotalar joint could be exposed. His hindfoot was essentially already fused. His talus and calcaneus moved as 1 piece. I did make a separate incision on the medial side of his ankle and dissected down to his tibiotalar joint here. I removed any remaining articular cartilage, which there was not much. His bone was quite soft, even with the feathering and using an osteotome and mallet, it crumbled quite easily. A drill bit was used to drill multiple drill holes in both sides. I ended up removing some of his medial malleolus. All this was done to correct his hindfoot deformity which was in valgus. Once I did all this, I could bring his foot around quite well and had a quite well corrected alignment. I placed several K wires across it to hold it here. There was significant bone graft which had been collected particularly from the fibula which was placed later in the case. Once the alignment was held, then I went about placing the hindfoot fusion riya. An incision was made on the plantar portion of the heel. A guidewire for a Biomet riya was placed into the tibia. This was reamed and then a size 11 x 180 mm riya was selected. This was placed using the jig. Two proximal screws were placed from medial to lateral.  I then placed a screw from lateral to medial in the talus and then used the internal compression device for compression. I first added significant amounts of bone graft that we had collected in the case throughout the arthrodesis area. Once all this was compressed, then a posterior to anterior screw was also placed through the jig.   After the final compression was done, the jig was removed. Images were taken and all planes showed appropriate alignment of his hindfoot with a hindfoot fusion riya. Incisions were closed with a Vicryl suture deep layer, Monocryl suture subcuticular layer, nylon sutures in the skin. Sterile dressings were placed. Bulky Barroso splint was placed. Patient was awakened and taken to recovery in stable condition.       MD Albin David Courser / [email or web address]  D: 01/10/2018 17:55     T: 01/10/2018 19:29  JOB #: [id number]

## 2018-01-11 NOTE — ANESTHESIA POSTPROCEDURE EVALUATION
Post-Anesthesia Evaluation and Assessment    Patient: Charla Kessler MRN: [id number]  SSN: [id number]    YOB: 1955  Age: 60 y.o. Sex: male       Cardiovascular Function/Vital Signs  Visit Vitals    BP (!) 143/92    Pulse 70    Temp 36.7 °C (98 °F)    Resp 16    Ht 5' 4.25\" (1.632 m)    Wt 76.7 kg (169 lb)    SpO2 96%    BMI 28.78 kg/m2       Patient is status post general anesthesia for Procedure(s):  LEFT TIBIO TALOR CALCANEAL FUSION WITH INTRAMEDULLARY LOS. Nausea/Vomiting: None    Postoperative hydration reviewed and adequate. Pain:  Pain Scale 1: Numeric (0 - 10) (01/11/18 1230)  Pain Intensity 1: 0 (01/11/18 1230)   Managed    Neurological Status:   Neuro (WDL): Exceptions to [facility] (01/10/18 1830)  Neuro  Neurologic State: Alert; Appropriate for age (01/11/18 0800)  Orientation Level: Appropriate for age;Oriented X4 (01/11/18 0800)  Cognition: Appropriate decision making; Appropriate safety awareness; Appropriate for age attention/concentration (01/11/18 0800)  Speech: Appropriate for age;Clear (01/11/18 0800)  LUE Motor Response: Purposeful (01/11/18 0800)  LLE Motor Response: Numbness;Tingling (01/11/18 0800)  RUE Motor Response: Purposeful (01/11/18 0800)  RLE Motor Response: Purposeful (01/11/18 0800)   At baseline    Mental Status and Level of Consciousness: Arousable    Pulmonary Status:   O2 Device: Nasal cannula (01/10/18 1830)   Adequate oxygenation and airway patent    Complications related to anesthesia: None    Post-anesthesia assessment completed.  No concerns    Signed By: Hoang Calderon MD     January 11, 2018

## 2018-01-11 NOTE — PROGRESS NOTES
The patient is ready for discharge now. Home care orders noted and sent to Jannet Silva via CC. Will follow. [facility] accepted the case.  ELIE

## 2018-01-11 NOTE — DISCHARGE INSTRUCTIONS
No weight left leg          Dr. Markus Holbrook Postoperative Patient Instructions    1. Please maintain the dressing and/or splint placed at surgery until your follow up appointment. We will remove it at your appointment. Please keep the dressing clean and dry. If you have any questions or problems with your dressing, please call our office at [phone].  2. Please elevate the operative extremity to the level of the heart to keep swelling at a minimum. You may get up to move around, but when seated please keep the extremity elevated as much as possible. This will decrease swelling and postoperative pain. 3. If you were told to be non-weight bearing following surgery, please do not place the foot on the ground. You may use crutches or we can help arrange for a scooter for you to stay off of your operative leg. 4. If you are in a special shoe or boot and told that you may bear weight as tolerated, then you may do so, but please keep the extremity elevated when not moving around. 5. If you had a block from the Anesthesia doctor before your surgery, expect this to wear off around 12-24 hours after you received it and you should start taking your pain medication when the feeling begins to come back into your leg. 6. A prescription for pain medicine is provided. The key to pain control is staying ahead. For the first 48-72 hours after your surgery, you may want to take your pain medication of a regular schedule. After that, you may only need it on an as-needed basis. 7. Please begin taking one Enteric Coated Aspirin 325 mg daily on the morning after your surgery until you are told you do not need to do this anymore. This can lower the risk of developing a blood clot after surgery. If you are not sure if you can take an Aspirin daily, please check with your primary care doctor to verify.   8. Signs and symptoms of infection include: redness, increased pain, increased swelling not relieved by elevation, drainage, fever, or chills. If you develop any of these symptoms, call the office at [phone].  9. Please follow-up at my office at 12-15 days after surgery or when specified at your pre-operative appointment. Please follow the [address] [place] [address] 10 Discharge Instructions provided to you by Dr. Jacob Storey for your medications.     DATE OF FOLLOW-UP APPOINTMENT: _____________________________________               [address] [address]    Shana Sol MD  1/11/2018

## 2018-01-11 NOTE — PROGRESS NOTES
Bedside shift change report given to [facility] (oncoming nurse) by Jessica Griggs (offgoing nurse). Report included the following information SBAR, Kardex, Intake/Output, MAR and Recent Results.

## 2018-01-11 NOTE — PROGRESS NOTES
Problem: Mobility Impaired (Adult and Pediatric)  Goal: *Acute Goals and Plan of Care (Insert Text)  Physical Therapy Goals  Initiated 1/11/2018  1. Patient will ambulate with modified independence for 300 feet with the least restrictive device within 7 day(s). 2.  Patient will ascend/descend 5 stairs with crutches with modified independence within 7 day(s). physical Therapy EVALUATION  Patient: Martín Sanford (30 y.o. male)  Date: 1/11/2018  Primary Diagnosis: ARTHRITIS LEFT ANKLE, LEFT ANKLE PAIN  Foot pain, left  Procedure(s) (LRB):  LEFT TIBIO TALOR CALCANEAL FUSION WITH INTRAMEDULLARY LOS (Left) 1 Day Post-Op   Precautions:  NWB    ASSESSMENT :  Based on the objective data described below, the patient presents with decreased functional independence compared to baseline with associated decreased ROM, strength, dynamic standing balance, tolerance to activity, and impaired gait. RN cleared patient for mobility and was received in bed with soft immobilizer/bandage on L ankle. Patient was mod I with all bed mobility and transfers, then ambulated 150 ft and completed 5 stairs with supervision using crutches. Patient returned to room and sat on EOB at end of session with all needs placed within reach and RN notified of patient's status. Patient is cleared for discharge home. PT recommending HHPT vs OP. Patient will benefit from skilled intervention to address the above impairments.   Patients rehabilitation potential is considered to be Excellent  Factors which may influence rehabilitation potential include:   [x]         None noted  []         Mental ability/status  []         Medical condition  []         Home/family situation and support systems  []         Safety awareness  []         Pain tolerance/management  []         Other:      PLAN :  Recommendations and Planned Interventions:  [x]           Bed Mobility Training             [x]    Neuromuscular Re-Education  [x]           Transfer Training []    Orthotic/Prosthetic Training  [x]           Gait Training                         []    Modalities  [x]           Therapeutic Exercises           []    Edema Management/Control  [x]           Therapeutic Activities            [x]    Patient and Family Training/Education  []           Other (comment):    Frequency/Duration: Patient will be followed by physical therapy 3 times a week to address goals. Discharge Recommendations: Home Health vs Outpatient  Further Equipment Recommendations for Discharge: none anticipated, owns crutches     SUBJECTIVE:   Patient stated Tesha used crutches for so long.     OBJECTIVE DATA SUMMARY:   HISTORY:    Past Medical History:   Diagnosis Date    Colon polyps 10/20/2016    Hyperlipidemia 5/27/2011    Osteoporosis 5/26/2011    Rheumatoid arthritis(714.0) 5/26/2011     Past Surgical History:   Procedure Laterality Date    ARTHROTOMY/EXPLORE/TREAT KNEE JOINT      left ankle    HX COLONOSCOPY      HX ORTHOPAEDIC Right 1995    TOTAL KNEE     Prior Level of Function/Home Situation: Independent  Personal factors and/or comorbidities impacting plan of care:     Home Situation  Home Environment: Private residence  # Steps to Enter: 5  One/Two Story Residence: One story  Living Alone: No  Support Systems: Family member(s), Friends \ neighbors, Spouse/Significant Other/Partner  Patient Expects to be Discharged to[de-identified] Private residence  Current DME Used/Available at Home: Crutches    EXAMINATION/PRESENTATION/DECISION MAKING:   Critical Behavior:  Neurologic State: Alert, Appropriate for age  Orientation Level: Appropriate for age, Oriented X4  Cognition: Appropriate decision making, Appropriate safety awareness, Appropriate for age attention/concentration     Hearing: Auditory  Auditory Impairment: None  Skin:    Edema:   Range Of Motion:  AROM: Within functional limits (immobilzed/NWB L ankle)                       Strength:    Strength:  Within functional limits (immobilzed/NWB L ankle)                    Tone & Sensation:                                  Coordination:  Coordination: Within functional limits (immobilzed/NWB L ankle)  Vision:      Functional Mobility:  Bed Mobility:     Supine to Sit: Modified independent  Sit to Supine: Modified independent     Transfers:  Sit to Stand: Modified independent  Stand to Sit: Modified independent                       Balance:   Sitting: Intact  Standing: Intact; With support  Ambulation/Gait Training:  Distance (ft): 150 Feet (ft)  Assistive Device: Gait belt;Crutches  Ambulation - Level of Assistance: Supervision        Gait Abnormalities:  (crutches)     Left Side Weight Bearing: Non-weight bearing  Base of Support: Center of gravity altered  Stance:  (NWB LLE)                           Stairs:  Number of Stairs Trained: 5  Stairs - Level of Assistance: Supervision   Rail Use: None (crutches)    Therapeutic Exercises:       Functional Measure:  Tinetti test:    Sitting Balance: 1  Arises: 1  Attempts to Rise: 2  Immediate Standing Balance: 1  Standing Balance: 1  Nudged: 2  Eyes Closed: 1  Turn 360 Degrees - Continuous/Discontinuous: 0  Turn 360 Degrees - Steady/Unsteady: 1  Sitting Down: 2  Balance Score: 12  Indication of Gait: 1  R Step Length/Height: 0  L Step Length/Height: 1  R Foot Clearance: 1  L Foot Clearance: 1  Step Symmetry: 0  Step Continuity: 0  Path: 1  Trunk: 0  Walking Time: 0  Gait Score: 5  Total Score: 17       Tinetti Test and G-code impairment scale:  Percentage of Impairment CH    0%   CI    1-19% CJ    20-39% CK    40-59% CL    60-79% CM    80-99% CN     100%   Tinetti  Score 0-28 28 23-27 17-22 12-16 6-11 1-5 0       Tinetti Tool Score Risk of Falls  <19 = High Fall Risk  19-24 = Moderate Fall Risk  25-28 = Low Fall Risk  Tinetti ME. Performance-Oriented Assessment of Mobility Problems in Elderly Patients. Pérez 65; [id number].  (Scoring Description: PT Bulletin Feb. 10, 1993)    Older adults: Jessica Robles et al, 2009; n = 1000 [facility] elderly evaluated with ABC, EVANGELIST, ADL, and IADL)  · Mean EVANGELIST score for males aged 69-68 years = 26.21(3.40)  · Mean EVANGELIST score for females age 69-68 years = 25.16(4.30)  · Mean EVANGELIST score for males over 80 years = 23.29(6.02)  · Mean EVANGELIST score for females over 80 years = 17.20(8.32)         G codes: In compliance with CMSs Claims Based Outcome Reporting, the following G-code set was chosen for this patient based on their primary functional limitation being treated: The outcome measure chosen to determine the severity of the functional limitation was the Tinetti with a score of 17/28 which was correlated with the impairment scale. ? Mobility - Walking and Moving Around:     - CURRENT STATUS: CJ - 20%-39% impaired, limited or restricted    - GOAL STATUS: CI - 1%-19% impaired, limited or restricted    - D/C STATUS:  ---------------To be determined---------------      Physical Therapy Evaluation Charge Determination   History Examination Presentation Decision-Making   MEDIUM  Complexity : 1-2 comorbidities / personal factors will impact the outcome/ POC  LOW Complexity : 1-2 Standardized tests and measures addressing body structure, function, activity limitation and / or participation in recreation  LOW Complexity : Stable, uncomplicated  Other outcome measures Tinetti  LOW       Based on the above components, the patient evaluation is determined to be of the following complexity level: LOW     Pain:  Pain Scale 1: Numeric (0 - 10)  Pain Intensity 1: 0              Activity Tolerance:   Good, cleared for discharge  Please refer to the flowsheet for vital signs taken during this treatment.   After treatment:   []         Patient left in no apparent distress sitting up in chair  [x]         Patient left in no apparent distress in bed  [x]         Call bell left within reach  [x]         Nursing notified  []         Caregiver present  []         Bed alarm activated    COMMUNICATION/EDUCATION:   The patients plan of care was discussed with: Registered Nurse. [x]         Fall prevention education was provided and the patient/caregiver indicated understanding. [x]         Patient/family have participated as able in goal setting and plan of care. [x]         Patient/family agree to work toward stated goals and plan of care. []         Patient understands intent and goals of therapy, but is neutral about his/her participation. []         Patient is unable to participate in goal setting and plan of care.     Thank you for this referral.  Yolanda Jorgensen PT, DPT   Time Calculation: 20 mins

## 2018-01-11 NOTE — PROGRESS NOTES
Primary Nurse Tony Lynne and Estuardo Up, RN performed a dual skin assessment on this patient No impairment noted  Jean-Pierre score is 21    Small (~2 cm) abrasion on Right hand POA

## 2018-01-11 NOTE — PROGRESS NOTES
Per MD [facility], put in order for CRM to setup HHPT. Give x1 Percocet  10mg. perMD brown. DC order per MD Leslie Loo set up per [facility]. I have reviewed discharge instructions with the patient. The patient and spouse verbalized understanding. All belongings packed and sent to DC with patient. Phone, wallet, keys, glasses, prescriptions, instructions.

## 2018-01-11 NOTE — PROGRESS NOTES
POD#1  Doing ok  Left leg splint intact   Not moving toes yet, block still working    PT today NWB left leg  Will check on later to see about dispo

## 2018-01-11 NOTE — ANESTHESIA POSTPROCEDURE EVALUATION
Post-Anesthesia Evaluation and Assessment    Patient: Ivette Bolton MRN: [id number]  SSN: [id number]    YOB: 1955  Age: 60 y.o. Sex: male       Cardiovascular Function/Vital Signs  Visit Vitals    BP (!) 143/92    Pulse 70    Temp 36.7 °C (98 °F)    Resp 16    Ht 5' 4.25\" (1.632 m)    Wt 76.7 kg (169 lb)    SpO2 96%    BMI 28.78 kg/m2       Patient is status post general anesthesia for Procedure(s):  LEFT TIBIO TALOR CALCANEAL FUSION WITH INTRAMEDULLARY LOS. Nausea/Vomiting: None    Postoperative hydration reviewed and adequate. Pain:  Pain Scale 1: Numeric (0 - 10) (01/11/18 1230)  Pain Intensity 1: 0 (01/11/18 1230)   Managed    Neurological Status:   Neuro (WDL): Exceptions to [facility] (01/10/18 1830)  Neuro  Neurologic State: Alert; Appropriate for age (01/11/18 0800)  Orientation Level: Appropriate for age;Oriented X4 (01/11/18 0800)  Cognition: Appropriate decision making; Appropriate safety awareness; Appropriate for age attention/concentration (01/11/18 0800)  Speech: Appropriate for age;Clear (01/11/18 0800)  LUE Motor Response: Purposeful (01/11/18 0800)  LLE Motor Response: Numbness;Tingling (01/11/18 0800)  RUE Motor Response: Purposeful (01/11/18 0800)  RLE Motor Response: Purposeful (01/11/18 0800)   At baseline    Mental Status and Level of Consciousness: Arousable    Pulmonary Status:   O2 Device: Nasal cannula (01/10/18 1830)   Adequate oxygenation and airway patent    Complications related to anesthesia: None    Post-anesthesia assessment completed.  No concerns    Signed By: Hill Carranza MD     January 11, 2018

## 2018-01-12 ENCOUNTER — HOME CARE VISIT (OUTPATIENT)
Dept: SCHEDULING | Facility: HOME HEALTH | Age: 63
End: 2018-01-12
Payer: MEDICARE

## 2018-01-12 VITALS — RESPIRATION RATE: 16 BRPM

## 2018-01-12 PROCEDURE — 3331090001 HH PPS REVENUE CREDIT

## 2018-01-12 PROCEDURE — G0151 HHCP-SERV OF PT,EA 15 MIN: HCPCS

## 2018-01-12 PROCEDURE — 3331090002 HH PPS REVENUE DEBIT

## 2018-01-12 PROCEDURE — 400013 HH SOC

## 2018-01-13 PROCEDURE — 3331090001 HH PPS REVENUE CREDIT

## 2018-01-13 PROCEDURE — 3331090002 HH PPS REVENUE DEBIT

## 2018-01-14 PROCEDURE — 3331090001 HH PPS REVENUE CREDIT

## 2018-01-14 PROCEDURE — 3331090002 HH PPS REVENUE DEBIT

## 2018-01-15 PROCEDURE — 3331090002 HH PPS REVENUE DEBIT

## 2018-01-15 PROCEDURE — 3331090001 HH PPS REVENUE CREDIT

## 2018-01-16 PROCEDURE — 3331090002 HH PPS REVENUE DEBIT

## 2018-01-16 PROCEDURE — 3331090001 HH PPS REVENUE CREDIT

## 2018-01-17 PROCEDURE — 3331090002 HH PPS REVENUE DEBIT

## 2018-01-17 PROCEDURE — 3331090001 HH PPS REVENUE CREDIT

## 2018-01-18 ENCOUNTER — HOME CARE VISIT (OUTPATIENT)
Dept: HOME HEALTH SERVICES | Facility: HOME HEALTH | Age: 63
End: 2018-01-18
Payer: MEDICARE

## 2018-01-18 PROCEDURE — 3331090001 HH PPS REVENUE CREDIT

## 2018-01-18 PROCEDURE — 3331090002 HH PPS REVENUE DEBIT

## 2018-01-19 PROCEDURE — 3331090002 HH PPS REVENUE DEBIT

## 2018-01-19 PROCEDURE — 3331090001 HH PPS REVENUE CREDIT

## 2018-01-20 PROCEDURE — 3331090001 HH PPS REVENUE CREDIT

## 2018-01-20 PROCEDURE — 3331090002 HH PPS REVENUE DEBIT

## 2018-01-21 PROCEDURE — 3331090002 HH PPS REVENUE DEBIT

## 2018-01-21 PROCEDURE — 3331090001 HH PPS REVENUE CREDIT

## 2018-01-22 PROCEDURE — 3331090002 HH PPS REVENUE DEBIT

## 2018-01-22 PROCEDURE — 3331090001 HH PPS REVENUE CREDIT

## 2018-01-23 PROCEDURE — 3331090001 HH PPS REVENUE CREDIT

## 2018-01-23 PROCEDURE — 3331090002 HH PPS REVENUE DEBIT

## 2018-01-24 PROCEDURE — 3331090001 HH PPS REVENUE CREDIT

## 2018-01-24 PROCEDURE — 3331090002 HH PPS REVENUE DEBIT

## 2018-01-25 PROCEDURE — 3331090002 HH PPS REVENUE DEBIT

## 2018-01-25 PROCEDURE — 3331090001 HH PPS REVENUE CREDIT

## 2018-01-26 ENCOUNTER — HOME CARE VISIT (OUTPATIENT)
Dept: HOME HEALTH SERVICES | Facility: HOME HEALTH | Age: 63
End: 2018-01-26
Payer: MEDICARE

## 2018-01-26 PROCEDURE — 3331090001 HH PPS REVENUE CREDIT

## 2018-01-26 PROCEDURE — 3331090003 HH PPS REVENUE ADJ

## 2018-01-26 PROCEDURE — 3331090002 HH PPS REVENUE DEBIT

## 2020-02-25 PROBLEM — M79.672 FOOT PAIN, LEFT: Status: RESOLVED | Noted: 2018-01-10 | Resolved: 2020-02-25

## 2021-06-01 ENCOUNTER — TRANSCRIBE ORDER (OUTPATIENT)
Dept: SCHEDULING | Age: 66
End: 2021-06-01

## 2021-06-01 DIAGNOSIS — M54.16 RIGHT LUMBAR RADICULITIS: ICD-10-CM

## 2021-06-01 DIAGNOSIS — M43.10 ISTHMIC SPONDYLOLISTHESIS: Primary | ICD-10-CM

## 2021-06-01 DIAGNOSIS — M47.817 LUMBOSACRAL SPONDYLOSIS: ICD-10-CM

## 2021-06-10 ENCOUNTER — HOSPITAL ENCOUNTER (OUTPATIENT)
Dept: MRI IMAGING | Age: 66
Discharge: HOME | End: 2021-06-10
Attending: PHYSICAL MEDICINE & REHABILITATION
Payer: MEDICARE

## 2021-06-10 DIAGNOSIS — M47.817 LUMBOSACRAL SPONDYLOSIS: ICD-10-CM

## 2021-06-10 DIAGNOSIS — M54.16 RIGHT LUMBAR RADICULITIS: ICD-10-CM

## 2021-06-10 DIAGNOSIS — M43.10 ISTHMIC SPONDYLOLISTHESIS: ICD-10-CM

## 2021-06-10 PROCEDURE — 72148 MRI LUMBAR SPINE W/O DYE: CPT

## 2023-05-11 RX ORDER — ALENDRONATE SODIUM 70 MG/1
TABLET ORAL
COMMUNITY
Start: 2020-01-13

## 2023-05-11 RX ORDER — PREDNISONE 5 MG/1
1 TABLET ORAL DAILY
COMMUNITY
Start: 2020-01-09

## 2023-05-11 RX ORDER — BUMETANIDE 1 MG/1
1 TABLET ORAL DAILY PRN
COMMUNITY
Start: 2020-01-14

## 2023-05-11 RX ORDER — ASPIRIN 325 MG
TABLET ORAL DAILY
COMMUNITY
Start: 2018-01-11

## 2023-05-11 RX ORDER — LEFLUNOMIDE 20 MG/1
1 TABLET ORAL DAILY
COMMUNITY
Start: 2020-01-14

## (undated) DEVICE — Device

## (undated) DEVICE — CONVERTORS STOCKINETTE: Brand: CONVERTORS

## (undated) DEVICE — BIT DRL L5IN DIA2.4MM STD ST S STL TWST BUSA

## (undated) DEVICE — GAUZE SPONGES,12 PLY: Brand: CURITY

## (undated) DEVICE — SYR LR LCK 1ML GRAD NSAF 30ML --

## (undated) DEVICE — TOWEL SURG W17XL27IN STD BLU COT NONFENESTRATED PREWASHED

## (undated) DEVICE — SURGICAL PROCEDURE PACK BASIN MAJ SET CUST NO CAUT

## (undated) DEVICE — GUIDEWIRE ORTH L80CM DIA2.6MM S STL PARTIALLY THRD BEAD TIP

## (undated) DEVICE — ZIMMER® STERILE DISPOSABLE TOURNIQUET CUFF WITH PLC, DUAL PORT, SINGLE BLADDER, 24 IN. (61 CM)

## (undated) DEVICE — REM POLYHESIVE ADULT PATIENT RETURN ELECTRODE: Brand: VALLEYLAB

## (undated) DEVICE — DRESSING PETRO GZ XRFRM CURAD ST OVERWRAP 5 X 9 IN

## (undated) DEVICE — HANDLE LT SNAP ON ULT DURABLE LENS FOR TRUMPF ALC DISPOSABLE

## (undated) DEVICE — DRAPE XR C ARM 41X74IN LF --

## (undated) DEVICE — 3M™ IOBAN™ 2 ANTIMICROBIAL INCISE DRAPE 6650EZ: Brand: IOBAN™ 2

## (undated) DEVICE — ASTOUND STANDARD SURGICAL GOWN, XXL: Brand: CONVERTORS

## (undated) DEVICE — INFECTION CONTROL KIT SYS

## (undated) DEVICE — ROCKER SWITCH PENCIL BLADE ELECTRODE, HOLSTER: Brand: EDGE

## (undated) DEVICE — ARGYLE FRAZIER SURGICAL SUCTION INSTRUMENT 10 FR/CH (3.3 MM): Brand: ARGYLE

## (undated) DEVICE — (D)PREP SKN CHLRAPRP APPL 26ML -- CONVERT TO ITEM 371833

## (undated) DEVICE — SUTURE VCRL SZ 2-0 L36IN ABSRB UD L36MM CT-1 1/2 CIR J945H

## (undated) DEVICE — 4-PORT MANIFOLD: Brand: NEPTUNE 2

## (undated) DEVICE — PADDING CST 4IN STERILE --

## (undated) DEVICE — STERILE POLYISOPRENE POWDER-FREE SURGICAL GLOVES WITH EMOLLIENT COATING: Brand: PROTEXIS

## (undated) DEVICE — BASIC PACK: Brand: CONVERTORS

## (undated) DEVICE — SUTURE MCRYL SZ 3-0 L27IN ABSRB UD L19MM PS-2 3/8 CIR PRIM Y427H

## (undated) DEVICE — DRAPE,U/ SHT,SPLIT,PLAS,STERIL: Brand: MEDLINE

## (undated) DEVICE — DRAPE,REIN 53X77,STERILE: Brand: MEDLINE

## (undated) DEVICE — SUT ETHLN 3-0 18IN PS1 BLK --

## (undated) DEVICE — SOLUTION IV 1000ML 0.9% SOD CHL

## (undated) DEVICE — BIT DRL L320MM DIA4.3MM CALIB FOR PHOENIX SYS

## (undated) DEVICE — DEVON™ KNEE AND BODY STRAP 60" X 3" (1.5 M X 7.6 CM): Brand: DEVON

## (undated) DEVICE — DRAPE,EXTREMITY,89X128,STERILE: Brand: MEDLINE